# Patient Record
Sex: FEMALE | Race: WHITE | NOT HISPANIC OR LATINO | Employment: UNEMPLOYED | URBAN - METROPOLITAN AREA
[De-identification: names, ages, dates, MRNs, and addresses within clinical notes are randomized per-mention and may not be internally consistent; named-entity substitution may affect disease eponyms.]

---

## 2023-02-02 ENCOUNTER — TELEPHONE (OUTPATIENT)
Dept: PRIMARY CARE CLINIC | Facility: CLINIC | Age: 28
End: 2023-02-02

## 2023-02-02 NOTE — TELEPHONE ENCOUNTER
Patient called regarding appointment. Patient informed we will call patient back with appointment time and date when one is available. Patient voiced understanding.  ----- Message from Alejandro Pineda sent at 2/2/2023  4:04 PM CST -----  Contact: [phone]  Calling to schedule NP appt(its not pulling up schedule) . Please call back at [phone] . Thanks.dj

## 2023-02-03 ENCOUNTER — TELEPHONE (OUTPATIENT)
Dept: PRIMARY CARE CLINIC | Facility: CLINIC | Age: 28
End: 2023-02-03

## 2023-02-03 NOTE — TELEPHONE ENCOUNTER
Patient called twice answer left voice mail noting reason for call gave time and date of the appt  via message